# Patient Record
Sex: MALE | Race: BLACK OR AFRICAN AMERICAN | NOT HISPANIC OR LATINO | URBAN - METROPOLITAN AREA
[De-identification: names, ages, dates, MRNs, and addresses within clinical notes are randomized per-mention and may not be internally consistent; named-entity substitution may affect disease eponyms.]

---

## 2021-12-09 ENCOUNTER — OUT OF OFFICE VISIT (OUTPATIENT)
Dept: URBAN - METROPOLITAN AREA MEDICAL CENTER 12 | Facility: MEDICAL CENTER | Age: 31
End: 2021-12-09
Payer: MEDICARE

## 2021-12-09 DIAGNOSIS — E46 MALNUTRITION: ICD-10-CM

## 2021-12-09 DIAGNOSIS — R13.19 CERVICAL DYSPHAGIA: ICD-10-CM

## 2021-12-09 PROCEDURE — G8430 EC AT DOC MEDREC PT NOT ELIG: HCPCS | Performed by: INTERNAL MEDICINE

## 2021-12-09 PROCEDURE — 99221 1ST HOSP IP/OBS SF/LOW 40: CPT | Performed by: INTERNAL MEDICINE

## 2021-12-10 ENCOUNTER — OUT OF OFFICE VISIT (OUTPATIENT)
Dept: URBAN - METROPOLITAN AREA MEDICAL CENTER 12 | Facility: MEDICAL CENTER | Age: 31
End: 2021-12-10
Payer: MEDICARE

## 2021-12-10 DIAGNOSIS — R13.19 CERVICAL DYSPHAGIA: ICD-10-CM

## 2021-12-10 PROCEDURE — 43246 EGD PLACE GASTROSTOMY TUBE: CPT | Performed by: INTERNAL MEDICINE

## 2021-12-26 ENCOUNTER — OUT OF OFFICE VISIT (OUTPATIENT)
Dept: URBAN - METROPOLITAN AREA MEDICAL CENTER 12 | Facility: MEDICAL CENTER | Age: 31
End: 2021-12-26
Payer: MEDICARE

## 2021-12-26 DIAGNOSIS — K94.23 COMPLICATION OF FEEDING TUBE: ICD-10-CM

## 2021-12-26 PROCEDURE — 99233 SBSQ HOSP IP/OBS HIGH 50: CPT | Performed by: INTERNAL MEDICINE

## 2021-12-27 ENCOUNTER — OUT OF OFFICE VISIT (OUTPATIENT)
Dept: URBAN - METROPOLITAN AREA MEDICAL CENTER 12 | Facility: MEDICAL CENTER | Age: 31
End: 2021-12-27
Payer: MEDICARE

## 2021-12-27 DIAGNOSIS — R74.8 ABNORMAL ALKALINE PHOSPHATASE TEST: ICD-10-CM

## 2021-12-27 DIAGNOSIS — K94.23 COMPLICATION OF FEEDING TUBE: ICD-10-CM

## 2021-12-27 PROCEDURE — 99232 SBSQ HOSP IP/OBS MODERATE 35: CPT | Performed by: INTERNAL MEDICINE

## 2022-01-07 ENCOUNTER — OFFICE VISIT (OUTPATIENT)
Dept: URBAN - METROPOLITAN AREA CLINIC 88 | Facility: CLINIC | Age: 32
End: 2022-01-07
Payer: MEDICARE

## 2022-01-07 ENCOUNTER — WEB ENCOUNTER (OUTPATIENT)
Dept: URBAN - METROPOLITAN AREA CLINIC 88 | Facility: CLINIC | Age: 32
End: 2022-01-07

## 2022-01-07 VITALS
DIASTOLIC BLOOD PRESSURE: 79 MMHG | BODY MASS INDEX: 20.14 KG/M2 | WEIGHT: 118 LBS | HEIGHT: 64 IN | HEART RATE: 92 BPM | TEMPERATURE: 96.8 F | SYSTOLIC BLOOD PRESSURE: 134 MMHG

## 2022-01-07 DIAGNOSIS — R79.89 ELEVATED LFTS: ICD-10-CM

## 2022-01-07 PROCEDURE — 99203 OFFICE O/P NEW LOW 30 MIN: CPT | Performed by: REGISTERED NURSE

## 2022-01-07 RX ORDER — TRAZODONE HYDROCHLORIDE 100 MG/1
1 TABLET AT BEDTIME TABLET, FILM COATED ORAL ONCE A DAY
Status: ACTIVE | COMMUNITY

## 2022-01-07 RX ORDER — LORAZEPAM 2 MG/1
1 TABLET AT BEDTIME AS NEEDED TABLET ORAL ONCE A DAY
Status: ACTIVE | COMMUNITY

## 2022-01-07 RX ORDER — LEVOCARNITINE 330 MG/1
TABLET ORAL
Qty: 0 | Refills: 0 | Status: DISCONTINUED | COMMUNITY
Start: 1900-01-01

## 2022-01-07 NOTE — HPI-TODAY'S VISIT:
Pt presents for evaluation of elevated LFTs. He has a hx of cerebral palsy and developmental delays. He was recently admitted at [facility] with seizures and acute respiratory failure due to aspiration. Labs on 1/1/22, which were the last labs prior to discharge, show alkaline phosphatase 428, , and AST 58.  During his hospital stay, his LFTs fluctuated up and down. Labs are in his chart. Pt does not drink alcohol. Additional testing includes a viral hepatitis panel that was negative. US and CT showed normal appearance of the liver. Pt was seen in 2017/2018 for elevated LFTs. Workup at that time was negative for viral hepatitis. Smooth muscle antibody was mildly elevated. Liver biopsy was done 5/31/17 and showed no inflammation, fibrosis, or steatosis.

## 2022-03-01 ENCOUNTER — OFFICE VISIT (OUTPATIENT)
Dept: URBAN - METROPOLITAN AREA CLINIC 118 | Facility: CLINIC | Age: 32
End: 2022-03-01
Payer: MEDICARE

## 2022-03-01 DIAGNOSIS — R74.8 ABNORMAL LIVER ENZYMES: ICD-10-CM

## 2022-03-01 DIAGNOSIS — R53.1 WEAKNESS: ICD-10-CM

## 2022-03-01 PROBLEM — 13791008: Status: ACTIVE | Noted: 2022-03-01

## 2022-03-01 PROCEDURE — 99214 OFFICE O/P EST MOD 30 MIN: CPT | Performed by: INTERNAL MEDICINE

## 2022-03-01 RX ORDER — TRAZODONE HYDROCHLORIDE 100 MG/1
1 TABLET AT BEDTIME TABLET, FILM COATED ORAL ONCE A DAY
Status: ACTIVE | COMMUNITY

## 2022-03-01 RX ORDER — LORAZEPAM 2 MG/1
1 TABLET AT BEDTIME AS NEEDED TABLET ORAL ONCE A DAY
Status: ACTIVE | COMMUNITY

## 2022-03-01 RX ORDER — CANNABIDIOL 100 MG/ML
AS DIRECTED SOLUTION ORAL
Status: ACTIVE | COMMUNITY

## 2022-03-01 NOTE — HPI-TODAY'S VISIT:
Pt here for follow up.  Labs not done since last visit.  Pt known to me from 2018, when he underwent extensive liver evaluation that was unrevealing.  Hepatology consult recommended then, but not performed.  Pt was hospitalized from 11/21/21 - 1/3/22 at [facility] with aspiration, seizures, PEA arrest.  Pt had pulled out tubes and lines repeatedly during that visit.  He had a PEG placed on 11/22/21, that he pulled out 4 days later. Currently, mother states he is not quite himself and he has been more unsteady and falls off and on over the last week.  Speech seem off.  No N/V or complaints of abd pain.  Seems to have decreased appetite.  BMs regular, qd, no change, no GI bleed. Plt count = 228K in 12/2021, with normal LFTs except for elevated alk phos.

## 2022-03-01 NOTE — PHYSICAL EXAM CONSTITUTIONAL:
Pt with cerebral palsy, in no acute distress, ambulating with awkward gait, poor communication ability

## 2022-03-02 LAB
A/G RATIO: 1.1
ALBUMIN: 4
ALKALINE PHOSPHATASE: 270
ALT (SGPT): 125
AST (SGOT): 83
BASO (ABSOLUTE): 0
BASOS: 0
BILIRUBIN, TOTAL: <0.2
BUN/CREATININE RATIO: 19
BUN: 14
CALCIUM: 9.6
CARBON DIOXIDE, TOTAL: 21
CHLORIDE: 108
CREATININE: 0.75
EGFR: 123
EOS (ABSOLUTE): 0
EOS: 0
GLOBULIN, TOTAL: 3.6
GLUCOSE: 62
HEMATOCRIT: 35.8
HEMATOLOGY COMMENTS:: (no result)
HEMOGLOBIN: 11.1
IMMATURE CELLS: (no result)
IMMATURE GRANS (ABS): 0
IMMATURE GRANULOCYTES: 0
LYMPHS (ABSOLUTE): 0.9
LYMPHS: 14
MCH: 26.9
MCHC: 31
MCV: 87
MONOCYTES(ABSOLUTE): 0.4
MONOCYTES: 6
NEUTROPHILS (ABSOLUTE): 5.2
NEUTROPHILS: 80
NRBC: (no result)
PLATELETS: 89
POTASSIUM: 4.5
PROTEIN, TOTAL: 7.6
RBC: 4.12
RDW: 15.1
SODIUM: 144
WBC: 6.6

## 2022-03-16 ENCOUNTER — OUT OF OFFICE VISIT (OUTPATIENT)
Dept: URBAN - METROPOLITAN AREA MEDICAL CENTER 16 | Facility: MEDICAL CENTER | Age: 32
End: 2022-03-16
Payer: MEDICARE

## 2022-03-16 DIAGNOSIS — K80.20 ASYMPTOMATIC CHOLELITHIASIS: ICD-10-CM

## 2022-03-16 DIAGNOSIS — R74.01 ABNORMAL/ELEVATED TRANSAMINASE (SGOT, AMINOTRANSFERASE): ICD-10-CM

## 2022-03-16 DIAGNOSIS — D64.89 ANEMIA DUE TO OTHER CAUSE: ICD-10-CM

## 2022-03-16 DIAGNOSIS — R74.8 ABNORMAL ALKALINE PHOSPHATASE TEST: ICD-10-CM

## 2022-03-16 PROCEDURE — G8427 DOCREV CUR MEDS BY ELIG CLIN: HCPCS

## 2022-03-16 PROCEDURE — 99222 1ST HOSP IP/OBS MODERATE 55: CPT

## 2022-04-15 ENCOUNTER — OFFICE VISIT (OUTPATIENT)
Dept: URBAN - METROPOLITAN AREA CLINIC 118 | Facility: CLINIC | Age: 32
End: 2022-04-15

## 2022-06-24 ENCOUNTER — LAB OUTSIDE AN ENCOUNTER (OUTPATIENT)
Dept: URBAN - METROPOLITAN AREA CLINIC 118 | Facility: CLINIC | Age: 32
End: 2022-06-24

## 2022-06-24 ENCOUNTER — OFFICE VISIT (OUTPATIENT)
Dept: URBAN - METROPOLITAN AREA CLINIC 118 | Facility: CLINIC | Age: 32
End: 2022-06-24
Payer: MEDICARE

## 2022-06-24 VITALS
BODY MASS INDEX: 18.61 KG/M2 | WEIGHT: 109 LBS | TEMPERATURE: 98.1 F | HEART RATE: 72 BPM | HEIGHT: 64 IN | DIASTOLIC BLOOD PRESSURE: 81 MMHG | SYSTOLIC BLOOD PRESSURE: 131 MMHG

## 2022-06-24 DIAGNOSIS — K76.9 LIVER LESION, LEFT LOBE: ICD-10-CM

## 2022-06-24 DIAGNOSIS — R74.8 ABNORMAL LIVER ENZYMES: ICD-10-CM

## 2022-06-24 DIAGNOSIS — D69.6 THROMBOCYTOPENIA: ICD-10-CM

## 2022-06-24 PROBLEM — 300331000: Status: ACTIVE | Noted: 2022-06-24

## 2022-06-24 PROBLEM — 415116008: Status: ACTIVE | Noted: 2022-06-24

## 2022-06-24 PROCEDURE — 99213 OFFICE O/P EST LOW 20 MIN: CPT | Performed by: INTERNAL MEDICINE

## 2022-06-24 RX ORDER — LORAZEPAM 2 MG/1
1 TABLET AT BEDTIME AS NEEDED TABLET ORAL ONCE A DAY
Status: ACTIVE | COMMUNITY

## 2022-06-24 RX ORDER — LEVOCARNITINE 500 MG
1 TABLET TABLET ORAL TWICE A DAY
Status: ACTIVE | COMMUNITY

## 2022-06-24 RX ORDER — CANNABIDIOL 100 MG/ML
AS DIRECTED SOLUTION ORAL
Status: ACTIVE | COMMUNITY

## 2022-06-24 RX ORDER — TRAZODONE HYDROCHLORIDE 100 MG/1
1 TABLET AT BEDTIME TABLET, FILM COATED ORAL ONCE A DAY
Status: ACTIVE | COMMUNITY

## 2022-06-24 NOTE — HPI-TODAY'S VISIT:
6/24/22 - Pt here for follow up.  Labs reviewed.  LFTs remain elevated.  3/1/22 - AST/ALT/ALP = 83/125/270, plt count = 89K.  Since last visit, pt had RUQ U/S on 3/15/22 showing 1.2 cm hyperechoic lesion in L lobe of liver.  Then, had nuclear scan that was unremarkable - ordered by Neurology.  He has reduced dosing on Vimpat and Zonegran.  No other complaints.  Had labs on 5/19/22, results unknown. *********************** 3/1/22 - Pt here for follow up.  Labs not done since last visit.  Pt known to me from 2018, when he underwent extensive liver evaluation that was unrevealing.  Hepatology consult recommended then, but not performed.  Pt was hospitalized from 11/21/21 - 1/3/22 at [facility] with aspiration, seizures, PEA arrest.  Pt had pulled out tubes and lines repeatedly during that visit.  He had a PEG placed on 11/22/21, that he pulled out 4 days later. Currently, mother states he is not quite himself and he has been more unsteady and falls off and on over the last week.  Speech seem off.  No N/V or complaints of abd pain.  Seems to have decreased appetite.  BMs regular, qd, no change, no GI bleed. Plt count = 228K in 12/2021, with normal LFTs except for elevated alk phos.

## 2022-06-25 LAB
A/G RATIO: 1.2
ALBUMIN: 4.4
ALKALINE PHOSPHATASE: 258
ALT (SGPT): 118
AST (SGOT): 65
BASO (ABSOLUTE): 0
BASOS: 0
BILIRUBIN, TOTAL: <0.2
BUN/CREATININE RATIO: 18
BUN: 17
CALCIUM: 9.7
CARBON DIOXIDE, TOTAL: 25
CHLORIDE: 103
CHOLESTEROL, TOTAL: 206
CREATININE: 0.96
EGFR: 108
EOS (ABSOLUTE): 0
EOS: 0
GLOBULIN, TOTAL: 3.7
GLUCOSE: 92
HEMATOCRIT: 39.3
HEMATOLOGY COMMENTS:: (no result)
HEMOGLOBIN: 12.3
IMMATURE CELLS: (no result)
IMMATURE GRANS (ABS): 0
IMMATURE GRANULOCYTES: 0
LDH: 268
LYMPHS (ABSOLUTE): 1.7
LYMPHS: 32
MCH: 26.9
MCHC: 31.3
MCV: 86
MONOCYTES(ABSOLUTE): 0.5
MONOCYTES: 9
NEUTROPHILS (ABSOLUTE): 3.1
NEUTROPHILS: 59
NRBC: (no result)
PLATELETS: 161
POTASSIUM: 4.8
PROTEIN, TOTAL: 8.1
RBC: 4.58
RDW: 13.8
SEDIMENTATION RATE-WESTERGREN: 100
SODIUM: 139
TSH: 0.72
WBC: 5.3

## 2022-07-12 ENCOUNTER — TELEPHONE ENCOUNTER (OUTPATIENT)
Dept: URBAN - METROPOLITAN AREA CLINIC 92 | Facility: CLINIC | Age: 32
End: 2022-07-12

## 2022-07-21 ENCOUNTER — LAB OUTSIDE AN ENCOUNTER (OUTPATIENT)
Dept: URBAN - METROPOLITAN AREA CLINIC 118 | Facility: CLINIC | Age: 32
End: 2022-07-21

## 2022-07-22 LAB
ANA SCREEN, IFA: NEGATIVE
IMMUNOGLOBULIN A: 145
IMMUNOGLOBULIN G: 2021
IMMUNOGLOBULIN M: 141
SMOOTH MUSCLE AB SCREEN: POSITIVE
SMOOTH MUSCLE AB TITER: (no result)

## 2022-07-26 ENCOUNTER — TELEPHONE ENCOUNTER (OUTPATIENT)
Dept: URBAN - METROPOLITAN AREA CLINIC 118 | Facility: CLINIC | Age: 32
End: 2022-07-26

## 2022-08-26 ENCOUNTER — OFFICE VISIT (OUTPATIENT)
Dept: URBAN - METROPOLITAN AREA CLINIC 118 | Facility: CLINIC | Age: 32
End: 2022-08-26

## 2022-09-20 ENCOUNTER — OFFICE VISIT (OUTPATIENT)
Dept: URBAN - METROPOLITAN AREA CLINIC 118 | Facility: CLINIC | Age: 32
End: 2022-09-20
Payer: MEDICARE

## 2022-09-20 ENCOUNTER — DASHBOARD ENCOUNTERS (OUTPATIENT)
Age: 32
End: 2022-09-20

## 2022-09-20 VITALS
TEMPERATURE: 97.7 F | DIASTOLIC BLOOD PRESSURE: 70 MMHG | BODY MASS INDEX: 18.61 KG/M2 | SYSTOLIC BLOOD PRESSURE: 123 MMHG | WEIGHT: 109 LBS | HEART RATE: 61 BPM | HEIGHT: 64 IN

## 2022-09-20 DIAGNOSIS — R74.8 ABNORMAL LIVER ENZYMES: ICD-10-CM

## 2022-09-20 PROCEDURE — 99212 OFFICE O/P EST SF 10 MIN: CPT | Performed by: INTERNAL MEDICINE

## 2022-09-20 RX ORDER — LORAZEPAM 2 MG/1
1 TABLET AT BEDTIME AS NEEDED TABLET ORAL ONCE A DAY
Status: ACTIVE | COMMUNITY

## 2022-09-20 RX ORDER — TRAZODONE HYDROCHLORIDE 100 MG/1
1 TABLET AT BEDTIME TABLET, FILM COATED ORAL ONCE A DAY
Status: ACTIVE | COMMUNITY

## 2022-09-20 RX ORDER — LEVOCARNITINE 500 MG
1 TABLET TABLET ORAL TWICE A DAY
Status: ACTIVE | COMMUNITY

## 2022-09-20 RX ORDER — CANNABIDIOL 100 MG/ML
AS DIRECTED SOLUTION ORAL
Status: ACTIVE | COMMUNITY

## 2022-09-20 NOTE — HPI-TODAY'S VISIT:
9/20/22 - Pt here for follow up.  Since last visit, pt has seen Dr. Meek at [facility] for 2nd opinion, and his LFTs normalized.  Pt is doing well on decreased meds.  Per mother, his seizure activity has also decreased. *********************** 6/24/22 - Pt here for follow up.  Labs reviewed.  LFTs remain elevated.  3/1/22 - AST/ALT/ALP = 83/125/270, plt count = 89K.  Since last visit, pt had RUQ U/S on 3/15/22 showing 1.2 cm hyperechoic lesion in L lobe of liver.  Then, had nuclear scan that was unremarkable - ordered by Neurology.  He has reduced dosing on Vimpat and Zonegran.  No other complaints.  Had labs on 5/19/22, results unknown. *********************** 3/1/22 - Pt here for follow up.  Labs not done since last visit.  Pt known to me from 2018, when he underwent extensive liver evaluation that was unrevealing.  Hepatology consult recommended then, but not performed.  Pt was hospitalized from 11/21/21 - 1/3/22 at [facility] with aspiration, seizures, PEA arrest.  Pt had pulled out tubes and lines repeatedly during that visit.  He had a PEG placed on 11/22/21, that he pulled out 4 days later. Currently, mother states he is not quite himself and he has been more unsteady and falls off and on over the last week.  Speech seem off.  No N/V or complaints of abd pain.  Seems to have decreased appetite.  BMs regular, qd, no change, no GI bleed. Plt count = 228K in 12/2021, with normal LFTs except for elevated alk phos.

## 2023-07-25 NOTE — PHYSICAL EXAM CONSTITUTIONAL:
well developed, well nourished , in no acute distress , ambulating without difficulty , normal communication ability
medications/heat/rest